# Patient Record
Sex: MALE | URBAN - METROPOLITAN AREA
[De-identification: names, ages, dates, MRNs, and addresses within clinical notes are randomized per-mention and may not be internally consistent; named-entity substitution may affect disease eponyms.]

---

## 2020-02-18 ENCOUNTER — EMERGENCY (EMERGENCY)
Facility: HOSPITAL | Age: 55
LOS: 1 days | Discharge: HOME | End: 2020-02-18
Attending: EMERGENCY MEDICINE | Admitting: EMERGENCY MEDICINE
Payer: COMMERCIAL

## 2020-02-18 VITALS
SYSTOLIC BLOOD PRESSURE: 135 MMHG | RESPIRATION RATE: 18 BRPM | DIASTOLIC BLOOD PRESSURE: 90 MMHG | OXYGEN SATURATION: 97 % | TEMPERATURE: 97 F | WEIGHT: 179.9 LBS | HEART RATE: 110 BPM

## 2020-02-18 VITALS
SYSTOLIC BLOOD PRESSURE: 154 MMHG | RESPIRATION RATE: 16 BRPM | HEART RATE: 87 BPM | DIASTOLIC BLOOD PRESSURE: 98 MMHG | OXYGEN SATURATION: 99 % | TEMPERATURE: 98 F

## 2020-02-18 PROCEDURE — 99283 EMERGENCY DEPT VISIT LOW MDM: CPT

## 2020-02-18 PROCEDURE — 85610 PROTHROMBIN TIME: CPT

## 2020-02-18 PROCEDURE — 36415 COLL VENOUS BLD VENIPUNCTURE: CPT

## 2020-02-18 PROCEDURE — 85730 THROMBOPLASTIN TIME PARTIAL: CPT

## 2020-02-18 PROCEDURE — 85025 COMPLETE CBC W/AUTO DIFF WBC: CPT

## 2020-02-18 PROCEDURE — 80053 COMPREHEN METABOLIC PANEL: CPT

## 2020-02-18 PROCEDURE — 99284 EMERGENCY DEPT VISIT MOD MDM: CPT

## 2020-02-18 RX ORDER — APIXABAN 2.5 MG/1
1 TABLET, FILM COATED ORAL
Qty: 1 | Refills: 0
Start: 2020-02-18

## 2020-02-18 RX ORDER — SODIUM CHLORIDE 9 MG/ML
2000 INJECTION INTRAMUSCULAR; INTRAVENOUS; SUBCUTANEOUS ONCE
Refills: 0 | Status: COMPLETED | OUTPATIENT
Start: 2020-02-18 | End: 2020-02-18

## 2020-02-18 RX ORDER — APIXABAN 2.5 MG/1
10 TABLET, FILM COATED ORAL ONCE
Refills: 0 | Status: COMPLETED | OUTPATIENT
Start: 2020-02-18 | End: 2020-02-18

## 2020-02-18 RX ADMIN — APIXABAN 10 MILLIGRAM(S): 2.5 TABLET, FILM COATED ORAL at 20:28

## 2020-02-18 RX ADMIN — SODIUM CHLORIDE 2000 MILLILITER(S): 9 INJECTION INTRAMUSCULAR; INTRAVENOUS; SUBCUTANEOUS at 19:20

## 2020-02-18 NOTE — ED ADULT NURSE NOTE - OBJECTIVE STATEMENT
pt a&ox3 resting comfortably in stretcher. pt reports left calf pain that worsened today. pt reports pain lying down and increased pain with movement. left calf warm and minor swelling noted. pt reports travel to [place] 2 weeks ago.  pt denies cp, sob, n ,v ,d, fevers. pt a&ox3 resting comfortably in stretcher. pt reports left calf pain x "a few days" that worsened today. Pt went to for US and was sent to ed for eval.  pt reports pain lying down and increased pain with movement. left calf warm and minor swelling noted. pt reports travel to [facility] 2 weeks ago. reoprts feeling well after flight.  pt denies cp, sob, n ,v ,d, fevers. left calf elevated as pt reports decrease in pain.

## 2020-02-18 NOTE — ED PROVIDER NOTE - PHYSICAL EXAMINATION
Minimal LLE non-pitting edema and minimal L calf ttp. no overlyin skin changes. normal equtal temps/pulses. sensation intact. strength 5/5. FROM all joints. Very well appearing.

## 2020-02-18 NOTE — ED PROVIDER NOTE - OBJECTIVE STATEMENT
54M PMH CAD w/ stent p/w DVT. Pt has 3d of L calf pain, constant, worse at night. Minimal swelling. Went to  where US shows "acute DVT originating in the L proximal superficial femoral vein and extending into the calf veins." UC referred to ED. Pt has no other systemic symptoms. Flew from [place] ~2w ago. Denies associated CP, SOB, ATKINS, NVD, lightheaded, diaphoresis, palpitations, cough/rhinorrhea, black/bloody stool, focal weakness/numbness, bleeding, abd pain, urinary complaints, f/c. No hormone use. No FMH clots/sudden death. No prior DVT.   meds: lipitor, coreg, lisinopril. Non-adherent to any of them. Taken off plavix many yrs ago. No AC. 54M PMH CAD w/ stent p/w DVT. Pt has 3d of L calf pain, constant, worse at night. Minimal swelling. Went to  where US shows "acute DVT originating in the L proximal superficial femoral vein and extending into the calf veins." UC referred to ED. Pt has no other systemic symptoms. Flew from [place] ~2w ago. Denies associated CP, SOB, ATKINS, NVD, lightheaded, diaphoresis, palpitations, cough/rhinorrhea, black/bloody stool, focal weakness/numbness, bleeding, abd pain, urinary complaints, f/c. No hormone use. No FMH clots/sudden death. No prior DVT.   meds: lipitor, coreg, lisinopril. Non-adherent to any of them. Taken off plavix many yrs ago. On asa 81, no other AC.    (called number jose florez for DVT study - no answer)

## 2020-02-18 NOTE — ED ADULT NURSE NOTE - NSIMPLEMENTINTERV_GEN_ALL_ED
Implemented All Fall Risk Interventions:  [place] to call system. Call bell, personal items and telephone within reach. Instruct patient to call for assistance. Room bathroom lighting operational. Non-slip footwear when patient is off stretcher. Physically safe environment: no spills, clutter or unnecessary equipment. Stretcher in lowest position, wheels locked, appropriate side rails in place. Provide visual cue, wrist band, yellow gown, etc. Monitor gait and stability. Monitor for mental status changes and reorient to person, place, and time. Review medications for side effects contributing to fall risk. Reinforce activity limits and safety measures with patient and family.

## 2020-02-18 NOTE — ED ADULT TRIAGE NOTE - OTHER COMPLAINTS
pt reports swelling and pain in left calf x 3 days.  pt had outpatient ultrasound diagnosed with DVT.  pt had flight to/from [place] 2 weeks ago. pt denies chest pain and shortness of breath. pt has hx of MI with stent placement

## 2020-02-18 NOTE — ED PROVIDER NOTE - NSFOLLOWUPINSTRUCTIONS_ED_ALL_ED_FT
Deep Vein Thrombosis    A deep vein thrombosis (DVT) is a blood clot (thrombus) that usually occurs in a deep, larger vein of the lower leg or the pelvis, or in an upper extremity such as the arm. These are dangerous and can lead to serious and even life-threatening complications if the clot travels to the lungs. Symptoms include swelling of the arm or leg, warmth and redness of the arm or leg, and pain. Treatment may include taking a blood thinning medication; make sure to take anything prescribed as instructed.    SEEK IMMEDIATE MEDICAL CARE IF YOU HAVE ANY OF THE FOLLOWING SYMPTOMS: shortness of breath, chest pain, rapid or irregular heartbeat, lightheadedness/dizziness, coughing up blood, or any bleeding in your vomit, stool, or urine. These symptoms may represent a serious problem that is an emergency. Do not wait to see if the symptoms will go away. Call 911 and do not drive yourself to the hospital. Can take tylenol 650mg every 6hrs as needed for pain.  Stay well hydrated.  Take eliquis as prescribed (10mg twice a day for 7 days then 5mg twice a day).  Return for worsening swelling, spreading redness, fevers, persistent vomit, uncontrolled pain, worsening breathing, worsening lightheaded, black stool.  Follow up with primary doctor within 1-2 days.   It is very important that you follow up with a hematologist (blood doctor) - Call numbers on your referral list for appointment. State that you were diagnosed with a DVT and started on eliquis and instructed to follow up.  Eliquis is a blood thinner. It increases your risk of bleeding. Avoid contact sports or anything with possibility of bodily injury. Avoid NSAIDs (motrin/advil/aleve/ibuprofen).   Avoid sitting in one spot for prolonged periods.     Deep Vein Thrombosis    A deep vein thrombosis (DVT) is a blood clot (thrombus) that usually occurs in a deep, larger vein of the lower leg or the pelvis, or in an upper extremity such as the arm. These are dangerous and can lead to serious and even life-threatening complications if the clot travels to the lungs. Symptoms include swelling of the arm or leg, warmth and redness of the arm or leg, and pain. Treatment may include taking a blood thinning medication; make sure to take anything prescribed as instructed.    SEEK IMMEDIATE MEDICAL CARE IF YOU HAVE ANY OF THE FOLLOWING SYMPTOMS: shortness of breath, chest pain, rapid or irregular heartbeat, lightheadedness/dizziness, coughing up blood, or any bleeding in your vomit, stool, or urine. These symptoms may represent a serious problem that is an emergency. Do not wait to see if the symptoms will go away. Call 911 and do not drive yourself to the hospital.

## 2020-02-18 NOTE — ED PROVIDER NOTE - PROGRESS NOTE DETAILS
[place]: LAbs grossly wnl. HR improved. Has no systemic symptoms other than mild LE pain. Good candidate for outpt tx. Discussed risks of starting on AC and importance of f/u and return instrucitons. Clinically no indication for further emergent ED workup or hospitalization at this time. Comfortable for dc, outpt f/u.

## 2020-02-23 DIAGNOSIS — M79.662 PAIN IN LEFT LOWER LEG: ICD-10-CM

## 2020-02-23 DIAGNOSIS — I82.402 ACUTE EMBOLISM AND THROMBOSIS OF UNSPECIFIED DEEP VEINS OF LEFT LOWER EXTREMITY: ICD-10-CM

## 2020-03-24 NOTE — ED PROVIDER NOTE - PATIENT PORTAL LINK FT
DISPLAY PLAN FREE TEXT You can access the FollowMyHealth Patient Portal offered by [facility] by registering at the following website: [email or web address]. By joining [facility]’s FollowMyHealth portal, you will also be able to view your health information using other applications (apps) compatible with our system.

## 2020-04-28 ENCOUNTER — APPOINTMENT (OUTPATIENT)
Age: 55
End: 2020-04-28
Payer: COMMERCIAL

## 2020-04-28 VITALS
HEART RATE: 74 BPM | OXYGEN SATURATION: 97 % | TEMPERATURE: 98.4 F | DIASTOLIC BLOOD PRESSURE: 82 MMHG | SYSTOLIC BLOOD PRESSURE: 121 MMHG

## 2020-04-28 VITALS — HEIGHT: 72 IN | WEIGHT: 191 LBS | BODY MASS INDEX: 25.87 KG/M2

## 2020-04-28 PROCEDURE — 36415 COLL VENOUS BLD VENIPUNCTURE: CPT

## 2020-04-28 PROCEDURE — 99204 OFFICE O/P NEW MOD 45 MIN: CPT | Mod: 25

## 2020-04-28 NOTE — HISTORY OF PRESENT ILLNESS
[de-identified] : 54 years old white male past medical history significant for acute coronary event at age 34 requiring stent... In February 2020 23 weeks after traveling to [place] he developed a left leg DVT and was placed on Eliquis... He has a brother and a sister neither of whom have clotting issues... And both his parents are alive and well..\par \par "acute DVT originating in the L proximal superficial femoral vein and extending\par into the calf veins."

## 2020-04-28 NOTE — ASSESSMENT
[FreeTextEntry1] : 54 years old without clear risk factor developed a DVT... We will do a hypercoagulable work-up...\par \par Be referred to internal medicine/cardiology within the system, will ask vascular surgery.

## 2020-05-01 LAB
25(OH)D3 SERPL-MCNC: 66.1 NG/ML
APTT IMM NP/PRE NP PPP: NORMAL
APTT INV RATIO PPP: 32.3 SEC
AT III PPP CHRO-ACNC: 129 %
B2 GLYCOPROT1 IGA SERPL IA-ACNC: <5 SAU
B2 GLYCOPROT1 IGG SER-ACNC: <5 SGU
B2 GLYCOPROT1 IGM SER-ACNC: <5 SMU
BASOPHILS # BLD AUTO: 0.03 K/UL
BASOPHILS NFR BLD AUTO: 0.6 %
CARDIOLIPIN AB SER IA-ACNC: NEGATIVE
CHOLEST SERPL-MCNC: 142 MG/DL
CHOLEST/HDLC SERPL: 2.3 RATIO
CONFIRM: 47.7 SEC
DRVVT IMM 1:2 NP PPP: NORMAL
DRVVT SCREEN TO CONFIRM RATIO: 0.85 RATIO
EOSINOPHIL # BLD AUTO: 0.06 K/UL
EOSINOPHIL NFR BLD AUTO: 1.1 %
FACT VIII ACT/NOR PPP: 128 %
HCT VFR BLD CALC: 46.1 %
HCYS SERPL-MCNC: 13.4 UMOL/L
HDLC SERPL-MCNC: 61 MG/DL
HGB BLD-MCNC: 15.5 G/DL
IMM GRANULOCYTES NFR BLD AUTO: 0.2 %
LDLC SERPL CALC-MCNC: 69 MG/DL
LYMPHOCYTES # BLD AUTO: 1.53 K/UL
LYMPHOCYTES NFR BLD AUTO: 28.9 %
MAN DIFF?: NORMAL
MCHC RBC-ENTMCNC: 31.8 PG
MCHC RBC-ENTMCNC: 33.6 GM/DL
MCV RBC AUTO: 94.7 FL
MONOCYTES # BLD AUTO: 0.42 K/UL
MONOCYTES NFR BLD AUTO: 7.9 %
NEUTROPHILS # BLD AUTO: 3.24 K/UL
NEUTROPHILS NFR BLD AUTO: 61.3 %
NPP NORMAL POOLED PLASMA: NORMAL SECS
PLATELET # BLD AUTO: 257 K/UL
PROT C PPP CHRO-ACNC: 136 %
PROT S AG ACT/NOR PPP IA: 121 %
RBC # BLD: 4.87 M/UL
RBC # FLD: 13.7 %
SCREEN DRVVT: 44.7 SEC
SILICA CLOTTING TIME INTERPRETATION: NORMAL
SILICA CLOTTING TIME S/C: 0.88 RATIO
TRIGL SERPL-MCNC: 62 MG/DL
TSH SERPL-ACNC: 0.92 UIU/ML
VIT B12 SERPL-MCNC: 721 PG/ML
WBC # FLD AUTO: 5.29 K/UL

## 2020-05-05 ENCOUNTER — APPOINTMENT (OUTPATIENT)
Dept: HEMATOLOGY ONCOLOGY | Facility: CLINIC | Age: 55
End: 2020-05-05
Payer: COMMERCIAL

## 2020-05-05 DIAGNOSIS — E72.12 METHYLENETETRAHYDROFOLATE REDUCTASE DEFICIENCY: ICD-10-CM

## 2020-05-05 LAB
DNA PLOIDY SPEC FC-IMP: NORMAL
PTR INTERP: NORMAL

## 2020-05-05 PROCEDURE — 99214 OFFICE O/P EST MOD 30 MIN: CPT | Mod: 95

## 2020-05-05 NOTE — CONSULT LETTER
[Dear  ___] : Dear  [unfilled], [Consult Letter:] : I had the pleasure of evaluating your patient, [unfilled]. [Please see my note below.] : Please see my note below. [Consult Closing:] : Thank you very much for allowing me to participate in the care of this patient.  If you have any questions, please do not hesitate to contact me. [Sincerely,] : Sincerely, [FreeTextEntry3] : Scarlett Allen MD\par

## 2020-05-05 NOTE — HISTORY OF PRESENT ILLNESS
[Home] : at home, [unfilled] , at the time of the visit. [Patient] : the patient [Medical Office: ([facility])___] : at the medical office located in  [Self] : self [de-identified] : 54 years old white male past medical history significant for acute coronary event at age 34 requiring stent... In February 2020 23 weeks after traveling to [place] he developed a left leg DVT and was placed on Eliquis... He has a brother and a sister neither of whom have clotting issues... And both his parents are alive and well..\par \par "acute DVT originating in the L proximal superficial femoral vein and extending\par into the calf veins." [de-identified] : 5/5/2020 patient wanted to discuss his hypercoagulable results... Hypercoagulable tests were actually negative, besides heterozygous MTHFR gene mutation...

## 2020-05-05 NOTE — REASON FOR VISIT
[Initial Consultation] : an initial consultation for [Blood Count Assessment] : blood count assessment [FreeTextEntry2] : DVT

## 2020-05-05 NOTE — ASSESSMENT
[FreeTextEntry1] : 54 years old without clear risk factor developed a DVT... Hypercoagulable work-up was essentially negative... Patient was referred to Dr. Prieto vascular surgeon to have a repeat sonogram prior to DC the anticoagulation...\par \par Discussed with patient the need to keep well-hydrated and mobile in order to prevent development of a clot again... As this will commit him to lifelong anticoagulation...\par \par After patient sees Dr. Prieto will discuss DC AC

## 2020-05-12 ENCOUNTER — APPOINTMENT (OUTPATIENT)
Dept: VASCULAR SURGERY | Facility: CLINIC | Age: 55
End: 2020-05-12
Payer: COMMERCIAL

## 2020-05-12 ENCOUNTER — TRANSCRIPTION ENCOUNTER (OUTPATIENT)
Age: 55
End: 2020-05-12

## 2020-05-12 PROCEDURE — 99203 OFFICE O/P NEW LOW 30 MIN: CPT

## 2020-05-12 PROCEDURE — 93971 EXTREMITY STUDY: CPT

## 2020-05-13 NOTE — PHYSICAL EXAM
[Respiratory Effort] : normal respiratory effort [Normal Heart Sounds] : normal heart sounds [2+] : left 2+ [Ankle Swelling (On Exam)] : present [Ankle Swelling On The Left] : of the left ankle [Ankle Swelling On The Right] : mild [Calm] : calm [de-identified] : well appearing

## 2020-05-13 NOTE — ASSESSMENT
[FreeTextEntry1] : 53 yo male with PMH with CAD with PCI at age 34 presents to the office for evaluation of DVT. Work up by heme showed no hypercoagulable disorder. He has been on the Eliquis since February for left femoral vein DVT. DVT happened a few weeks after he traveled. No personal or family history of DVT, hypercoagulable workup without significant findings. Recommend he continue AC for a total of 6 months. Will stop medication and will come back to the office after one week for repeat US. [Arterial/Venous Disease] : arterial/venous disease

## 2020-05-13 NOTE — HISTORY OF PRESENT ILLNESS
[FreeTextEntry1] : 55 yo male with PMH with CAD with PCI at age 34 presents to the office for evaluation of DVT. February 2020 he developed a left leg DVT, 2-3 weeks after he traveled to [place]. He was started on Eliquis which he has been taking. He is followed by Dr. Allen who did hypercoagulable workup and was found to have hetero MTHFR mutation but otherwise negative. No personal history of DVT\par \par No family history of DVT/SVT\par non smoker

## 2020-05-13 NOTE — PROCEDURE
[FreeTextEntry1] : Left leg US shows chronic DVT in the popliteal and PT veins, partial recan through pop vein, PT occluded.

## 2020-05-15 ENCOUNTER — APPOINTMENT (OUTPATIENT)
Dept: HEART AND VASCULAR | Facility: CLINIC | Age: 55
End: 2020-05-15

## 2020-05-17 ENCOUNTER — TRANSCRIPTION ENCOUNTER (OUTPATIENT)
Age: 55
End: 2020-05-17

## 2020-05-18 ENCOUNTER — TRANSCRIPTION ENCOUNTER (OUTPATIENT)
Age: 55
End: 2020-05-18

## 2020-05-26 ENCOUNTER — NON-APPOINTMENT (OUTPATIENT)
Age: 55
End: 2020-05-26

## 2020-05-26 ENCOUNTER — APPOINTMENT (OUTPATIENT)
Dept: HEART AND VASCULAR | Facility: CLINIC | Age: 55
End: 2020-05-26
Payer: COMMERCIAL

## 2020-05-26 VITALS
HEIGHT: 72 IN | BODY MASS INDEX: 24.52 KG/M2 | OXYGEN SATURATION: 97 % | WEIGHT: 181 LBS | HEART RATE: 88 BPM | TEMPERATURE: 98.7 F | SYSTOLIC BLOOD PRESSURE: 110 MMHG | DIASTOLIC BLOOD PRESSURE: 70 MMHG

## 2020-05-26 DIAGNOSIS — E78.00 PURE HYPERCHOLESTEROLEMIA, UNSPECIFIED: ICD-10-CM

## 2020-05-26 DIAGNOSIS — I25.10 ATHEROSCLEROTIC HEART DISEASE OF NATIVE CORONARY ARTERY W/OUT ANGINA PECTORIS: ICD-10-CM

## 2020-05-26 DIAGNOSIS — Z78.9 OTHER SPECIFIED HEALTH STATUS: ICD-10-CM

## 2020-05-26 DIAGNOSIS — I10 ESSENTIAL (PRIMARY) HYPERTENSION: ICD-10-CM

## 2020-05-26 LAB
ALBUMIN SERPL ELPH-MCNC: 4.9 G/DL
ALP BLD-CCNC: 56 U/L
ALT SERPL-CCNC: 28 U/L
ANION GAP SERPL CALC-SCNC: 13 MMOL/L
AST SERPL-CCNC: 26 U/L
BILIRUB SERPL-MCNC: 1.3 MG/DL
BUN SERPL-MCNC: 13 MG/DL
CALCIUM SERPL-MCNC: 10 MG/DL
CHLORIDE SERPL-SCNC: 102 MMOL/L
CO2 SERPL-SCNC: 25 MMOL/L
CREAT SERPL-MCNC: 1.07 MG/DL
ESTIMATED AVERAGE GLUCOSE: 105 MG/DL
GLUCOSE SERPL-MCNC: 93 MG/DL
HBA1C MFR BLD HPLC: 5.3 %
POTASSIUM SERPL-SCNC: 5 MMOL/L
PROT SERPL-MCNC: 7.2 G/DL
SODIUM SERPL-SCNC: 139 MMOL/L

## 2020-05-26 PROCEDURE — 36415 COLL VENOUS BLD VENIPUNCTURE: CPT

## 2020-05-26 PROCEDURE — 93000 ELECTROCARDIOGRAM COMPLETE: CPT

## 2020-05-26 PROCEDURE — 99203 OFFICE O/P NEW LOW 30 MIN: CPT

## 2020-05-26 RX ORDER — ATORVASTATIN CALCIUM 10 MG/1
10 TABLET, FILM COATED ORAL
Qty: 90 | Refills: 3 | Status: ACTIVE | COMMUNITY

## 2020-05-26 RX ORDER — LISINOPRIL 30 MG/1
TABLET ORAL
Refills: 0 | Status: DISCONTINUED | COMMUNITY
End: 2020-05-26

## 2020-05-26 NOTE — HISTORY OF PRESENT ILLNESS
[FreeTextEntry1] : Heme- Dr Allen\par Vasc- Dr Lou\par \par \par Also tales carnitine, alpha lipoic acid, Sandor Q-10, Vit C, Mg

## 2020-05-26 NOTE — PHYSICAL EXAM
[General Appearance - Well Developed] : well developed [Normal Appearance] : normal appearance [Well Groomed] : well groomed [General Appearance - Well Nourished] : well nourished [General Appearance - In No Acute Distress] : no acute distress [No Deformities] : no deformities [Normal Conjunctiva] : the conjunctiva exhibited no abnormalities [Eyelids - No Xanthelasma] : the eyelids demonstrated no xanthelasmas [Heart Rate And Rhythm] : heart rate and rhythm were normal [Heart Sounds] : normal S1 and S2 [Murmurs] : no murmurs present [Exaggerated Use Of Accessory Muscles For Inspiration] : no accessory muscle use [Respiration, Rhythm And Depth] : normal respiratory rhythm and effort [Auscultation Breath Sounds / Voice Sounds] : lungs were clear to auscultation bilaterally [Abdomen Soft] : soft [Abdomen Tenderness] : non-tender [Abdomen Mass (___ Cm)] : no abdominal mass palpated [Abnormal Walk] : normal gait [Nail Clubbing] : no clubbing of the fingernails [Gait - Sufficient For Exercise Testing] : the gait was sufficient for exercise testing [Cyanosis, Localized] : no localized cyanosis [Petechial Hemorrhages (___cm)] : no petechial hemorrhages [Skin Color & Pigmentation] : normal skin color and pigmentation [No Venous Stasis] : no venous stasis [] : no rash [Skin Lesions] : no skin lesions [No Skin Ulcers] : no skin ulcer [No Xanthoma] : no  xanthoma was observed [FreeTextEntry1] : L DP hard to find

## 2020-05-26 NOTE — REASON FOR VISIT
[FreeTextEntry1] : 53 y/o M with ASHD(remote stent  at age 34), ?HTN and ?HLD.  At age 34 pt was using cocaine but not smoking.  He was hospitalized at [facility], stented.  Recathed at [facility], all OK.  Was under the care of Dr Palencia at that time, has not seen him in years. Pt exercises and bikes to work.  Here without cardiac c/o but admits to non-compliance with all meds except aspirin 81mg 2 daily.   s/p a DVT after flying back from [place] in Feb 2020.  EF not known.\par \par \par EKG: NSR, normal axis and intervals, possible old IWMI, PRWP, no ST-Tw abnormalities. 5/26/20

## 2020-05-26 NOTE — ASSESSMENT
[FreeTextEntry1] : ASHD- reduce Aspirin  to once daily.  Post MI not taking BB or ACE I.   Will screen for CAD with a coronary calcium score to be followed by a stress test.  Will obtain EF at that time. He lacks classic cardiac risk factors denying HTN, HLD and DM.  Will check A1c and glucose today. \par \par HTN- Denies a h/o HTN and BP is normal today on no meds(he stopped both the lisinopril and Coreg).  Will leave off for now. \par \par HLD- Continue statin daily. He was taking this infrequently.\par \par DVT-  Urged to take Eliquis with breakfast and dinner. He was taking once daily.

## 2020-06-23 ENCOUNTER — APPOINTMENT (OUTPATIENT)
Dept: HEART AND VASCULAR | Facility: CLINIC | Age: 55
End: 2020-06-23

## 2020-08-11 ENCOUNTER — APPOINTMENT (OUTPATIENT)
Dept: VASCULAR SURGERY | Facility: CLINIC | Age: 55
End: 2020-08-11
Payer: COMMERCIAL

## 2020-08-11 DIAGNOSIS — I82.402 ACUTE EMBOLISM AND THROMBOSIS OF UNSPECIFIED DEEP VEINS OF LEFT LOWER EXTREMITY: ICD-10-CM

## 2020-08-11 PROCEDURE — 93971 EXTREMITY STUDY: CPT

## 2020-08-11 PROCEDURE — 99213 OFFICE O/P EST LOW 20 MIN: CPT

## 2020-08-12 PROBLEM — I82.402 LEFT LEG DVT: Status: ACTIVE | Noted: 2020-04-28

## 2020-08-13 NOTE — HISTORY OF PRESENT ILLNESS
[FreeTextEntry1] : 53 yo male with PMH with CAD with PCI at age 34 presents to the office for FU of his pop/PT DVT. February 2020 he developed a left leg DVT, 2-3 weeks after he traveled home from [place]. He was started on Eliquis which he has been taking for the past 6 months. He is followed by Dr. Allen who did hypercoagulable workup and was found to have hetero MTHFR mutation but otherwise negative. No personal history of DVT\par \par No family history of DVT/SVT\par non smoker

## 2020-08-13 NOTE — PHYSICAL EXAM
[Respiratory Effort] : normal respiratory effort [Normal Heart Sounds] : normal heart sounds [2+] : left 2+ [Ankle Swelling (On Exam)] : present [Ankle Swelling On The Left] : of the left ankle [Ankle Swelling On The Right] : mild [Calm] : calm [de-identified] : well appearing

## 2020-08-13 NOTE — ASSESSMENT
[Arterial/Venous Disease] : arterial/venous disease [FreeTextEntry1] : 55 yo male with PMH with CAD with PCI at age 34 presents to the office for FU of his DVT. Hypercoagulable workup without significant findings. He has been on Eliquis for 6 months, [place] to DC. Will return in as needed.

## 2020-10-23 ENCOUNTER — APPOINTMENT (OUTPATIENT)
Dept: INTERNAL MEDICINE | Facility: CLINIC | Age: 55
End: 2020-10-23
Payer: COMMERCIAL

## 2020-10-23 VITALS
HEIGHT: 72 IN | HEART RATE: 78 BPM | TEMPERATURE: 97 F | WEIGHT: 185 LBS | SYSTOLIC BLOOD PRESSURE: 127 MMHG | DIASTOLIC BLOOD PRESSURE: 87 MMHG | BODY MASS INDEX: 25.06 KG/M2 | OXYGEN SATURATION: 97 %

## 2020-10-23 DIAGNOSIS — Z23 ENCOUNTER FOR IMMUNIZATION: ICD-10-CM

## 2020-10-23 DIAGNOSIS — Z00.00 ENCOUNTER FOR GENERAL ADULT MEDICAL EXAMINATION W/OUT ABNORMAL FINDINGS: ICD-10-CM

## 2020-10-23 DIAGNOSIS — Z82.49 FAMILY HISTORY OF ISCHEMIC HEART DISEASE AND OTHER DISEASES OF THE CIRCULATORY SYSTEM: ICD-10-CM

## 2020-10-23 PROCEDURE — 99072 ADDL SUPL MATRL&STAF TM PHE: CPT

## 2020-10-23 PROCEDURE — 99386 PREV VISIT NEW AGE 40-64: CPT | Mod: 25

## 2020-10-23 PROCEDURE — 90686 IIV4 VACC NO PRSV 0.5 ML IM: CPT

## 2020-10-23 PROCEDURE — G0008: CPT

## 2020-10-23 RX ORDER — CARVEDILOL 12.5 MG/1
12.5 TABLET, FILM COATED ORAL
Refills: 0 | Status: DISCONTINUED | COMMUNITY
End: 2020-10-23

## 2020-10-23 RX ORDER — APIXABAN 5 MG/1
5 TABLET, FILM COATED ORAL
Qty: 60 | Refills: 3 | Status: DISCONTINUED | COMMUNITY
End: 2020-10-23

## 2021-10-06 PROBLEM — I10 ESSENTIAL HYPERTENSION: Status: ACTIVE | Noted: 2020-05-26

## 2022-01-20 ENCOUNTER — APPOINTMENT (OUTPATIENT)
Dept: INTERNAL MEDICINE | Facility: CLINIC | Age: 57
End: 2022-01-20